# Patient Record
Sex: FEMALE | Race: AMERICAN INDIAN OR ALASKA NATIVE
[De-identification: names, ages, dates, MRNs, and addresses within clinical notes are randomized per-mention and may not be internally consistent; named-entity substitution may affect disease eponyms.]

---

## 2020-11-21 ENCOUNTER — HOSPITAL ENCOUNTER (EMERGENCY)
Dept: HOSPITAL 5 - ED | Age: 85
LOS: 1 days | Discharge: HOME | End: 2020-11-22
Payer: SELF-PAY

## 2020-11-21 DIAGNOSIS — Z87.891: ICD-10-CM

## 2020-11-21 DIAGNOSIS — M19.90: ICD-10-CM

## 2020-11-21 DIAGNOSIS — I50.9: ICD-10-CM

## 2020-11-21 DIAGNOSIS — Z79.899: ICD-10-CM

## 2020-11-21 DIAGNOSIS — J44.9: ICD-10-CM

## 2020-11-21 DIAGNOSIS — K59.00: Primary | ICD-10-CM

## 2020-11-21 DIAGNOSIS — I11.0: ICD-10-CM

## 2020-11-21 LAB
BASOPHILS # (AUTO): 0 K/MM3 (ref 0–0.1)
BASOPHILS NFR BLD AUTO: 0.5 % (ref 0–1.8)
BUN SERPL-MCNC: 19 MG/DL (ref 7–17)
BUN/CREAT SERPL: 21 %
CALCIUM SERPL-MCNC: 9.9 MG/DL (ref 8.4–10.2)
EOSINOPHIL # BLD AUTO: 0.1 K/MM3 (ref 0–0.4)
EOSINOPHIL NFR BLD AUTO: 0.7 % (ref 0–4.3)
HCT VFR BLD CALC: 38.3 % (ref 30.3–42.9)
HEMOLYSIS INDEX: 4
HGB BLD-MCNC: 12.6 GM/DL (ref 10.1–14.3)
LYMPHOCYTES # BLD AUTO: 2.3 K/MM3 (ref 1.2–5.4)
LYMPHOCYTES NFR BLD AUTO: 26.4 % (ref 13.4–35)
MCHC RBC AUTO-ENTMCNC: 33 % (ref 30–34)
MCV RBC AUTO: 88 FL (ref 79–97)
MONOCYTES # (AUTO): 0.9 K/MM3 (ref 0–0.8)
MONOCYTES % (AUTO): 10.9 % (ref 0–7.3)
PLATELET # BLD: 284 K/MM3 (ref 140–440)
RBC # BLD AUTO: 4.34 M/MM3 (ref 3.65–5.03)

## 2020-11-21 PROCEDURE — 83880 ASSAY OF NATRIURETIC PEPTIDE: CPT

## 2020-11-21 PROCEDURE — 85025 COMPLETE CBC W/AUTO DIFF WBC: CPT

## 2020-11-21 PROCEDURE — 96372 THER/PROPH/DIAG INJ SC/IM: CPT

## 2020-11-21 PROCEDURE — 99284 EMERGENCY DEPT VISIT MOD MDM: CPT

## 2020-11-21 PROCEDURE — 74022 RADEX COMPL AQT ABD SERIES: CPT

## 2020-11-21 PROCEDURE — 36415 COLL VENOUS BLD VENIPUNCTURE: CPT

## 2020-11-21 PROCEDURE — 80048 BASIC METABOLIC PNL TOTAL CA: CPT

## 2020-11-21 NOTE — EMERGENCY DEPARTMENT REPORT
ED Abdominal Pain HPI





- General


Chief Complaint: Abdominal Pain


Stated Complaint: DIFFICULTY IN BREATHING, CONSTIPATION


Time Seen by Provider: 20 22:38


Source: patient, EMS


Mode of arrival: Wheelchair


Limitations: No Limitations





- History of Present Illness


Initial Comments: 





Patient is a 85-year-old F American female who is has a past medical history of 

congestive heart failure who is complaining of 10 days of constipation.  States 

she took some type of cleanser liquid that her daughter gave her and did not 

have a bowel movement.  She also used one enema.  Patient states she has just 

occasional shortness of breath but is not past her baseline at this time.  She 

is breathing fast secondary to being anxious and having a sensation that she has

to have a bowel movement.


Severity scale (0 -10): 0





- Related Data


                                Home Medications











 Medication  Instructions  Recorded  Confirmed  Last Taken


 


Benazepril HCl [Lotensin] 20 mg PO BID 20 Unknown


 


Furosemide [Lasix TAB] 40 mg PO QDAY 20 Unknown


 


Sertraline HCl [Zoloft] 100 mg PO QDAY 20 Unknown


 


carvediloL [Coreg] 3.125 mg PO BID 20 Unknown


 


hydrALAZINE [Apresoline TAB] 100 mg PO TID 20 Unknown








                                  Previous Rx's











 Medication  Instructions  Recorded  Last Taken  Type


 


Dicyclomine [Bentyl] 20 mg PO QID #10 tablet 20 Unknown Rx


 


Polyethylene Glycol 3350 [Miralax] 17 gm PO DAILY 3 Days  powder 20 

Unknown Rx











                                    Allergies











Allergy/AdvReac Type Severity Reaction Status Date / Time


 


No Known Allergies Allergy   Unverified 20 22:34














ED Review of Systems


ROS: 


Stated complaint: DIFFICULTY IN BREATHING, CONSTIPATION


Other details as noted in HPI





Comment: All other systems reviewed and negative





ED Past Medical Hx





- Past Medical History


Hx Hypertension: Yes


Hx Congestive Heart Failure: Yes


Hx Arthritis: Yes


Hx COPD: Yes


Additional medical history: nerve pain





- Social History


Smoking Status: Former Smoker


Substance Use Type: None





- Medications


Home Medications: 


                                Home Medications











 Medication  Instructions  Recorded  Confirmed  Last Taken  Type


 


Benazepril HCl [Lotensin] 20 mg PO BID 20 Unknown History


 


Furosemide [Lasix TAB] 40 mg PO QDAY 20 Unknown History


 


Sertraline HCl [Zoloft] 100 mg PO QDAY 20 Unknown History


 


carvediloL [Coreg] 3.125 mg PO BID 20 Unknown History


 


hydrALAZINE [Apresoline TAB] 100 mg PO TID 20 Unknown History


 


Dicyclomine [Bentyl] 20 mg PO QID #10 tablet 20  Unknown Rx


 


Polyethylene Glycol 3350 [Miralax] 17 gm PO DAILY 3 Days  powder 20  

Unknown Rx














ED Physical Exam





- General


Limitations: No Limitations


General appearance: alert, in no apparent distress





- Head


Head exam: Present: atraumatic, normocephalic





- Eye


Eye exam: Present: normal appearance, PERRL, EOMI





- ENT


ENT exam: Present: mucous membranes moist





- Neck


Neck exam: Present: normal inspection





- Respiratory


Respiratory exam: Present: normal lung sounds bilaterally.  Absent: respiratory 

distress, wheezes, rales, rhonchi





- Cardiovascular


Cardiovascular Exam: Present: regular rate, normal rhythm, normal heart sounds. 

 Absent: systolic murmur, diastolic murmur, rubs, gallop





- GI/Abdominal


GI/Abdominal exam: Present: soft, normal bowel sounds.  Absent: distended, 

tenderness, guarding, rebound





- Extremities Exam


Extremities exam: Present: normal inspection





- Back Exam


Back exam: Present: normal inspection





- Neurological Exam


Neurological exam: Present: alert, oriented X3





- Psychiatric


Psychiatric exam: Present: normal affect, normal mood





- Skin


Skin exam: Present: warm, dry, intact, normal color.  Absent: rash





ED Course





                                   Vital Signs











  20





  22:49


 


Temperature 98.1 F


 


Pulse Rate 107 H


 


Respiratory 16





Rate 


 


Blood Pressure 162/85





[Left] 


 


O2 Sat by Pulse 96





Oximetry 














ED Medical Decision Making





- Lab Data


Result diagrams: 


                                 20 22:56





                                 20 22:56





- Radiology Data





Patient: COOKSEY,ELIZABETH MR#:  


 [id number] 


 : 1935 Acct:[id number] 





 Age/Sex: 85 / F ADM Date: 20 





 Loc: ED 


 Attending Dr: 








 Ordering Physician: LAYNE LUQUE MD 


 Date of Service: 20 


 Procedure(s): XR abd series w cxr 1V 


 Accession Number(s): [id number] 





 cc: LAYNE LUQUE MD 





 Fluoro Time In Minutes: 





 ABDOMEN 4 VIEW(S) 





INDICATION / CLINICAL INFORMATION: 


abd pain constipation. 





COMPARISON: 


None available. 





FINDINGS: 





TUBES / LINES: None. 


BOWEL GAS PATTERN: No significant abnormality. 


FREE AIR / EXTRALUMINAL GAS: None seen. 





ADDITIONAL FINDINGS: Chronic interstitial changes are noted within both lungs. 

No consolidation or 


 effusion. 





IMPRESSION: 


1. No significant abnormality. 





Signer Name: Michael Rubio MD 


Signed: 2020 11:33 PM 


Workstation Name: [id number] 








- Medical Decision Making





Patient's x-ray does show some residual stool in the descending colon.  The 

rectum is had air.  Do not believe that a soapsuds enema would be helpful given 

the location of her stool.  Believe the patient would benefit from laxative and 

something for abdominal cramping.  Patient be discharged home.


Critical care attestation.: 


If time is entered above; I have spent that time in minutes in the direct care 

of this critically ill patient, excluding procedure time.








ED Disposition


Clinical Impression: 


 Constipation





Disposition: DC-01 TO HOME OR SELFCARE


Is pt being admited?: No


Does the pt Need Aspirin: No


Condition: Stable


Instructions:  Abdominal Pain (ED), Constipation, Adult


Referrals: 


PRIMARY CARE,MD [Primary Care Provider] - 3-5 Days


Time of Disposition: 00:01

## 2020-11-21 NOTE — XRAY REPORT
ABDOMEN 4 VIEW(S)



INDICATION / CLINICAL INFORMATION:

abd pain constipation.



COMPARISON: 

None available.



FINDINGS:



TUBES / LINES: None.

BOWEL GAS PATTERN: No significant abnormality. 

FREE AIR / EXTRALUMINAL GAS: None seen.



ADDITIONAL FINDINGS: Chronic interstitial changes are noted within both lungs. No consolidation or ef
fusion.



IMPRESSION:

1. No significant abnormality.



Signer Name: Michael Rubio MD 

Signed: 11/21/2020 11:33 PM

Workstation Name: [id number]

## 2020-11-22 VITALS — DIASTOLIC BLOOD PRESSURE: 74 MMHG | SYSTOLIC BLOOD PRESSURE: 168 MMHG
